# Patient Record
Sex: FEMALE | Race: WHITE
[De-identification: names, ages, dates, MRNs, and addresses within clinical notes are randomized per-mention and may not be internally consistent; named-entity substitution may affect disease eponyms.]

---

## 2017-05-03 ENCOUNTER — HOSPITAL ENCOUNTER (OUTPATIENT)
Dept: HOSPITAL 45 - C.LABBC | Age: 82
Discharge: HOME | End: 2017-05-03
Attending: INTERNAL MEDICINE
Payer: MEDICARE

## 2017-05-03 DIAGNOSIS — M81.0: ICD-10-CM

## 2017-05-03 DIAGNOSIS — M19.90: ICD-10-CM

## 2017-05-03 DIAGNOSIS — I10: ICD-10-CM

## 2017-05-03 DIAGNOSIS — I71.2: Primary | ICD-10-CM

## 2017-05-03 DIAGNOSIS — E78.5: ICD-10-CM

## 2017-05-03 DIAGNOSIS — G43.109: ICD-10-CM

## 2017-05-03 LAB
ALBUMIN/GLOB SERPL: 1.1 {RATIO} (ref 0.9–2)
ALP SERPL-CCNC: 58 U/L (ref 45–117)
ALT SERPL-CCNC: 29 U/L (ref 12–78)
ANION GAP SERPL CALC-SCNC: 4 MMOL/L (ref 3–11)
AST SERPL-CCNC: 22 U/L (ref 15–37)
BASOPHILS # BLD: 0.03 K/UL (ref 0–0.2)
BASOPHILS NFR BLD: 0.5 %
BUN SERPL-MCNC: 27 MG/DL (ref 7–18)
BUN/CREAT SERPL: 32.1 (ref 10–20)
CALCIUM SERPL-MCNC: 8.8 MG/DL (ref 8.5–10.1)
CHLORIDE SERPL-SCNC: 105 MMOL/L (ref 98–107)
CHOLEST/HDLC SERPL: 1.9 {RATIO}
CO2 SERPL-SCNC: 33 MMOL/L (ref 21–32)
COMPLETE: YES
CREAT SERPL-MCNC: 0.83 MG/DL (ref 0.6–1.2)
EOSINOPHIL NFR BLD AUTO: 229 K/UL (ref 130–400)
GLOBULIN SER-MCNC: 3.4 GM/DL (ref 2.5–4)
GLUCOSE SERPL-MCNC: 81 MG/DL (ref 70–99)
GLUCOSE UR QL: 85 MG/DL
HCT VFR BLD CALC: 43.6 % (ref 37–47)
IG%: 0.2 %
IMM GRANULOCYTES NFR BLD AUTO: 26.3 %
KETONES UR QL STRIP: 69 MG/DL
LYMPHOCYTES # BLD: 1.61 K/UL (ref 1.2–3.4)
MCH RBC QN AUTO: 31.6 PG (ref 25–34)
MCHC RBC AUTO-ENTMCNC: 32.8 G/DL (ref 32–36)
MCV RBC AUTO: 96.5 FL (ref 80–100)
MONOCYTES NFR BLD: 14.2 %
NEUTROPHILS # BLD AUTO: 2.6 %
NEUTROPHILS NFR BLD AUTO: 56.2 %
NITRITE UR QL STRIP: 47 MG/DL (ref 0–150)
PH UR: 163 MG/DL (ref 0–200)
PMV BLD AUTO: 10.8 FL (ref 7.4–10.4)
POTASSIUM SERPL-SCNC: 4 MMOL/L (ref 3.5–5.1)
RBC # BLD AUTO: 4.52 M/UL (ref 4.2–5.4)
SODIUM SERPL-SCNC: 142 MMOL/L (ref 136–145)
TSH SERPL-ACNC: 2.37 UIU/ML (ref 0.3–4.5)
VERY LOW DENSITY LIPOPROT CALC: 9 MG/DL
WBC # BLD AUTO: 6.12 K/UL (ref 4.8–10.8)

## 2017-06-07 ENCOUNTER — HOSPITAL ENCOUNTER (OUTPATIENT)
Dept: HOSPITAL 45 - C.MAMM | Age: 82
Discharge: HOME | End: 2017-06-07
Attending: INTERNAL MEDICINE
Payer: MEDICARE

## 2017-06-07 DIAGNOSIS — Z09: Primary | ICD-10-CM

## 2017-06-07 DIAGNOSIS — N60.02: ICD-10-CM

## 2017-06-07 NOTE — MAMMOGRAPHY REPORT
UNILATERAL LEFT DIGITAL DIAGNOSTIC MAMMOGRAM TOMOSYNTHESIS WITH CAD AND TARGETED LEFT ULTRASOUND: 6/7
/2017

CLINICAL HISTORY: 6 Month Follow-up Left.  





TECHNIQUE:  Breast tomosynthesis in addition to standard 2D mammography was performed. Current study 
was also evaluated with a Computer Aided Detection (CAD) system.  Left CC and MLO 2-D and tomosynthes
is images were obtained.



COMPARISON: Comparison is made to exams dated:  12/7/2016 mammogram, 12/7/2016 ultrasound, 11/28/2016
 mammogram, 11/23/2015 mammogram, 11/20/2014 mammogram, and 11/12/2013 mammogram - [facility].   



BREAST COMPOSITION:  The tissue of the left breast is heterogeneously dense, which may obscure small 
masses.  



FINDINGS:  Again noted is an oval circumscribed benign-appearing 5 mm mass seen within the left lower
 inner quadrant, best seen on the tomosynthesis images.  The mass does not appear significantly fish
ed compared to the prior exam.  The remainder of the left breast is stable compared to prior exams, w
ithout suspicious masses, calcifications, or areas of architectural distortion noted.



Targeted ultrasound was performed of the area of the mammographic mass.  In the left breast at 9:00, 
1 cm from the nipple, there is an oval anechoic circumscribed mass which measures 3 x 4 x 3 mm.  This
 is stable in size and appearance compared to the 12/7/2016 ultrasound exam and is benign and compati
ble with a cyst.





IMPRESSION:  ACR BI-RADS CATEGORY 2: BENIGN, TARGETED ULTRASOUND ACR BI-RADS CATEGORY 2: BENIGN 

Benign 4 mm cyst in the left breast at 9:00, stable compared to the 12/7/2016 exam.  There is no mamm
ographic or targeted sonographic evidence of malignancy. Return to annual mammogram screening schedul
e is recommended, due November 2017.  The patient has been verbally notified of the results.  





Approximately 10% of breast cancers are not detected with mammography. A negative mammographic report
 should not delay biopsy if a clinically suggestive mass is present.



Fransisca Mustafa M.D.          

/:6/7/2017 10:24:37  



Imaging Technologist: Sowmya RAWLS(WEST)(M), [facility]

letter sent: Normal 1/2  

BI-RADS Code: ACR BI-RADS Category 2: Benign  Ultrasound BI-RADS: ACR BI-RADS Category 2: Benign

## 2017-08-14 ENCOUNTER — HOSPITAL ENCOUNTER (OUTPATIENT)
Dept: HOSPITAL 45 - C.CTS | Age: 82
Discharge: HOME | End: 2017-08-14
Attending: INTERNAL MEDICINE
Payer: MEDICARE

## 2017-08-14 DIAGNOSIS — I71.2: Primary | ICD-10-CM

## 2017-08-14 NOTE — DIAGNOSTIC IMAGING REPORT
(CHEST) THORAX WITHOUT



CLINICAL HISTORY: 87 years-old Female presenting with aneurysm of the thoracic

aorta, history of repair, no new complaints. 



TECHNIQUE: Multidetector CT imaging of the chest was performed without the use

of intravenous contrast. IV contrast: None. A dose lowering technique was used

consistent with the principles of ALARA (as low as reasonably achievable).



COMPARISON: 5/16/2016.



CT DOSE (mGy.cm): The estimated cumulative dose is 210.17 mGycm.



FINDINGS:



 topogram: Median sternotomy wires and right axillary surgical clips noted.



On soft tissue windows, right axillary surgical clips unchanged. Normal thyroid.

No axillary, supraclavicular, hilar, or mediastinal lymphadenopathy. Surgical

material noted along the aortic root from grafting repair. Ectasia of the

proximal aortic arch, which measures up to 4.6 cm in transverse dimension,

previously 4.5 cm, essentially unchanged. The descending thoracic aorta is

normal in caliber. Mild coronary artery and aortic valve calcification.

Multichamber enlargement of the heart. No pericardial or pleural effusion. Right

fat-containing Bochdalek hernia.



On lung windows, bandlike opacity in the anterior right upper lobe, likely

atelectasis or scarring. Solid triangular 4 mm subpleural nodule in the right

lower lobe (series 4 image 187). An additional 5 mm solid pulmonary nodules

noted more inferiorly (series 4 image 200). Dependent bandlike opacities at the

left lower lobe with left lower lobe volume loss, likely subsegmental

atelectasis or scarring. Airways patent.



On bone windows, degenerative changes of the thoracic spine.



IMPRESSION:

1.  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 No

change in ectasia of the proximal aortic arch. No acute intrathoracic pathology.



2.  Cardiomegaly.



3.  Two solid pulmonary nodules unchanged from prior exam are stable for one

year. No further follow-up is warranted per Fleischer Society 2017

recommendations.



Please refer to below summary of Fleischner Society 2017 recommendations for

follow-up of incidental CT nodules (FORREST Austin et al. Guidelines for management

of incidental pulmonary nodules detected on CT images: From the Fleischner

Society 2017. Radiology 2017; 284: 228-243.)



SOLID NODULES



Single nodule; size <6 mm

*  Low risk patients: No routine follow-up

*  High risk patients: Optional CT at 12 months



Single nodule; size 6-8 mm

*  Low risk patients: CT at 6-12 months, then consider CT at 18-24 months

*  High risk patients: CT at 6-12 months, then at 18-24 months



Single nodule; size >8 mm

*  Either low or high risk patients: Considered CT at 3 months, PET/CT, or

tissue sampling



Multiple nodules; size <6 mm

*  Low risk patients: No routine follow up

*  High risk patients: Optional CT at 12 months



Multiple nodules; size 6-8 mm

*  Low risk patients: CT at 3-6 months, then consider CT at 18-24 months

*  High risk patients: CT at 3-6 months, then at 18-24 months



Multiple nodules; size >8 mm

*  Low risk patients: CT at 3-6 months, then consider at 18-24 months

*  High risk patients: CT at 3-6 months, then at 18-24 months



Note: These guidelines apply to incidental nodules. These guidelines did not

apply to patients younger than 35 years, immunocompromised patients, or patients

with cancer.

*  Low risk patients: Minimal or absent history of smoking and/or other known

risk factors

*  High risk patients: History of smoking, exposure to other carcinogens,

emphysema, fibrosis, upper lobe location, family history of lung cancer, etc. 

*  If a nodule up to 8 mm is partly solid or is ground glass further follow-up

is required after 24 months to exclude possible slow growing adenocarcinoma



SUBSOLID NODULES



Single ground-glass nodule

*  Nodule size < 6 mm: No routine follow-up

*  Nodule size > or = 6 mm: CT at 6-12 months to confirm persistence, then CT

every 2 years until 5 years



Single part-solid nodule

*  Nodule size < 6 mm: No routine follow-up

*  Nodules size > or = 6 mm: CT at 3-6 months to confirm persistence. If

unchanged and solid component remains < 6 mm, annual CT should be performed for

5 years



Multiple nodules

*  Nodule size < 6 mm: CT at 3-6 months. If stable, consider CT at 2 and 4

years.

*  Nodules size > or = 6 mm: CT at 3-6 months. Subsequent management based on

the most suspicious nodule(s)







       







Electronically signed by:  Solis Alarcon M.D.

8/14/2017 3:26 PM



Dictated Date/Time:  8/14/2017 3:16 PM

## 2017-11-29 ENCOUNTER — HOSPITAL ENCOUNTER (OUTPATIENT)
Dept: HOSPITAL 45 - C.MAMM | Age: 82
Discharge: HOME | End: 2017-11-29
Attending: INTERNAL MEDICINE
Payer: MEDICARE

## 2017-11-29 DIAGNOSIS — Z12.31: Primary | ICD-10-CM

## 2018-02-15 ENCOUNTER — HOSPITAL ENCOUNTER (OUTPATIENT)
Dept: HOSPITAL 45 - C.RADBC | Age: 83
Discharge: HOME | End: 2018-02-15
Attending: INTERNAL MEDICINE
Payer: MEDICARE

## 2018-02-15 DIAGNOSIS — M16.11: ICD-10-CM

## 2018-02-15 DIAGNOSIS — M25.551: Primary | ICD-10-CM

## 2018-02-15 NOTE — DIAGNOSTIC IMAGING REPORT
R HIP UNILATERAL 2 VIEWS



HISTORY:  87 years-old Female M25.559 Hip [id number] chronic right hip

pain without known trauma



COMPARISON: None available



TECHNIQUE: 2 views of the right hip



FINDINGS: 

Moderate to severe right hip osteoarthritis with moderate bone demineralization.

No acute fracture or subluxation is identified. Imaged right hemipelvis appears

intact. Soft tissues are unremarkable.



IMPRESSION: Moderate to severe right osteoarthritis without acute fracture or

dislocation. 







The above report was generated using voice recognition software. It may contain

grammatical, syntax or spelling errors.







Electronically signed by:  Meir Hardy M.D.

2/15/2018 11:50 AM



Dictated Date/Time:  2/15/2018 11:49 AM

## 2018-03-01 ENCOUNTER — HOSPITAL ENCOUNTER (OUTPATIENT)
Dept: HOSPITAL 45 - C.RADBC | Age: 83
Discharge: HOME | End: 2018-03-01
Attending: INTERNAL MEDICINE
Payer: MEDICARE

## 2018-03-01 DIAGNOSIS — R06.09: Primary | ICD-10-CM

## 2018-03-01 DIAGNOSIS — I10: ICD-10-CM

## 2018-03-01 LAB
ALBUMIN SERPL-MCNC: 3.5 GM/DL (ref 3.4–5)
ALP SERPL-CCNC: 56 U/L (ref 45–117)
ALT SERPL-CCNC: 66 U/L (ref 12–78)
AST SERPL-CCNC: 37 U/L (ref 15–37)
BASOPHILS # BLD: 0.04 K/UL (ref 0–0.2)
BASOPHILS NFR BLD: 0.5 %
BUN SERPL-MCNC: 34 MG/DL (ref 7–18)
CALCIUM SERPL-MCNC: 8.8 MG/DL (ref 8.5–10.1)
CO2 SERPL-SCNC: 32 MMOL/L (ref 21–32)
CREAT SERPL-MCNC: 0.96 MG/DL (ref 0.6–1.2)
EOS ABS #: 0.09 K/UL (ref 0–0.5)
EOSINOPHIL NFR BLD AUTO: 218 K/UL (ref 130–400)
GLUCOSE SERPL-MCNC: 98 MG/DL (ref 70–99)
HCT VFR BLD CALC: 40.3 % (ref 37–47)
HGB BLD-MCNC: 13.3 G/DL (ref 12–16)
IG#: 0.02 K/UL (ref 0–0.02)
IMM GRANULOCYTES NFR BLD AUTO: 24.4 %
LYMPHOCYTES # BLD: 1.87 K/UL (ref 1.2–3.4)
MCH RBC QN AUTO: 31.5 PG (ref 25–34)
MCHC RBC AUTO-ENTMCNC: 33 G/DL (ref 32–36)
MCV RBC AUTO: 95.5 FL (ref 80–100)
MONO ABS #: 0.81 K/UL (ref 0.11–0.59)
MONOCYTES NFR BLD: 10.6 %
NEUT ABS #: 4.84 K/UL (ref 1.4–6.5)
NEUTROPHILS # BLD AUTO: 1.2 %
NEUTROPHILS NFR BLD AUTO: 63 %
PMV BLD AUTO: 10.5 FL (ref 7.4–10.4)
POTASSIUM SERPL-SCNC: 3.8 MMOL/L (ref 3.5–5.1)
PROT SERPL-MCNC: 6.6 GM/DL (ref 6.4–8.2)
RED CELL DISTRIBUTION WIDTH CV: 14.4 % (ref 11.5–14.5)
RED CELL DISTRIBUTION WIDTH SD: 50.3 FL (ref 36.4–46.3)
SODIUM SERPL-SCNC: 138 MMOL/L (ref 136–145)
WBC # BLD AUTO: 7.67 K/UL (ref 4.8–10.8)

## 2018-03-01 NOTE — DIAGNOSTIC IMAGING REPORT
CHEST 2 VIEWS ROUTINE



CLINICAL HISTORY: I10 [id number] Dyspnea on exertion    



COMPARISON STUDY:  Chest CT dated 8/14/2017



FINDINGS: There are postsurgical changes of midline sternotomy. The heart is

enlarged. There is aortic tortuosity/ectasia. Surgical clips project over the

right upper lung zone. There is no failure. There is no focal pulmonary

consolidation. There are no pleural effusions.[ There is a thoracolumbar

scoliosis.



IMPRESSION: Cardiomegaly. No evidence of focal pulmonary consolidation







Electronically signed by:  Miguel Sanders M.D.

3/1/2018 3:58 PM



Dictated Date/Time:  3/1/2018 3:57 PM

## 2018-04-16 ENCOUNTER — HOSPITAL ENCOUNTER (OUTPATIENT)
Dept: HOSPITAL 45 - C.LABBC | Age: 83
Discharge: HOME | End: 2018-04-16
Attending: INTERNAL MEDICINE
Payer: MEDICARE

## 2018-04-16 DIAGNOSIS — Z01.818: Primary | ICD-10-CM

## 2018-04-16 LAB
BUN SERPL-MCNC: 28 MG/DL (ref 7–18)
CALCIUM SERPL-MCNC: 9.8 MG/DL (ref 8.5–10.1)
CO2 SERPL-SCNC: 29 MMOL/L (ref 21–32)
CREAT SERPL-MCNC: 1.05 MG/DL (ref 0.6–1.2)
EOSINOPHIL NFR BLD AUTO: 268 K/UL (ref 130–400)
GLUCOSE SERPL-MCNC: 98 MG/DL (ref 70–99)
HCT VFR BLD CALC: 44.4 % (ref 37–47)
HGB BLD-MCNC: 14.9 G/DL (ref 12–16)
INR PPP: 1 (ref 0.9–1.1)
MCH RBC QN AUTO: 31.8 PG (ref 25–34)
MCHC RBC AUTO-ENTMCNC: 33.6 G/DL (ref 32–36)
MCV RBC AUTO: 94.9 FL (ref 80–100)
PMV BLD AUTO: 10.6 FL (ref 7.4–10.4)
POTASSIUM SERPL-SCNC: 4 MMOL/L (ref 3.5–5.1)
PTT PATIENT: 27.1 SECONDS (ref 21–31)
RED CELL DISTRIBUTION WIDTH CV: 14.5 % (ref 11.5–14.5)
RED CELL DISTRIBUTION WIDTH SD: 50.2 FL (ref 36.4–46.3)
SODIUM SERPL-SCNC: 137 MMOL/L (ref 136–145)
WBC # BLD AUTO: 9.01 K/UL (ref 4.8–10.8)

## 2018-08-09 ENCOUNTER — HOSPITAL ENCOUNTER (OUTPATIENT)
Dept: HOSPITAL 45 - C.LABBC | Age: 83
Discharge: HOME | End: 2018-08-09
Attending: INTERNAL MEDICINE
Payer: MEDICARE

## 2018-08-09 DIAGNOSIS — I71.2: Primary | ICD-10-CM

## 2018-08-09 DIAGNOSIS — E78.5: ICD-10-CM

## 2018-08-09 DIAGNOSIS — M81.0: ICD-10-CM

## 2018-08-09 DIAGNOSIS — I10: ICD-10-CM

## 2018-08-09 DIAGNOSIS — M19.90: ICD-10-CM

## 2018-08-09 LAB
ALBUMIN SERPL-MCNC: 3.5 GM/DL (ref 3.4–5)
ALP SERPL-CCNC: 56 U/L (ref 45–117)
ALT SERPL-CCNC: 37 U/L (ref 12–78)
AST SERPL-CCNC: 29 U/L (ref 15–37)
BASOPHILS # BLD: 0.06 K/UL (ref 0–0.2)
BASOPHILS NFR BLD: 0.7 %
BUN SERPL-MCNC: 24 MG/DL (ref 7–18)
CALCIUM SERPL-MCNC: 8.6 MG/DL (ref 8.5–10.1)
CO2 SERPL-SCNC: 28 MMOL/L (ref 21–32)
CREAT SERPL-MCNC: 1.04 MG/DL (ref 0.6–1.2)
EOS ABS #: 0.11 K/UL (ref 0–0.5)
EOSINOPHIL NFR BLD AUTO: 255 K/UL (ref 130–400)
GLUCOSE SERPL-MCNC: 109 MG/DL (ref 70–99)
HCT VFR BLD CALC: 41.4 % (ref 37–47)
HGB BLD-MCNC: 13.7 G/DL (ref 12–16)
IG#: 0.02 K/UL (ref 0–0.02)
IMM GRANULOCYTES NFR BLD AUTO: 22.8 %
KETONES UR QL STRIP: 33 MG/DL
LYMPHOCYTES # BLD: 1.83 K/UL (ref 1.2–3.4)
MCH RBC QN AUTO: 30.9 PG (ref 25–34)
MCHC RBC AUTO-ENTMCNC: 33.1 G/DL (ref 32–36)
MCV RBC AUTO: 93.5 FL (ref 80–100)
MONO ABS #: 0.81 K/UL (ref 0.11–0.59)
MONOCYTES NFR BLD: 10.1 %
NEUT ABS #: 5.18 K/UL (ref 1.4–6.5)
NEUTROPHILS # BLD AUTO: 1.4 %
NEUTROPHILS NFR BLD AUTO: 64.8 %
PH UR: 122 MG/DL (ref 0–200)
PMV BLD AUTO: 10.2 FL (ref 7.4–10.4)
POTASSIUM SERPL-SCNC: 3.8 MMOL/L (ref 3.5–5.1)
PROT SERPL-MCNC: 6.8 GM/DL (ref 6.4–8.2)
RED CELL DISTRIBUTION WIDTH CV: 14.7 % (ref 11.5–14.5)
RED CELL DISTRIBUTION WIDTH SD: 50.8 FL (ref 36.4–46.3)
SODIUM SERPL-SCNC: 136 MMOL/L (ref 136–145)
WBC # BLD AUTO: 8.01 K/UL (ref 4.8–10.8)

## 2018-08-15 ENCOUNTER — HOSPITAL ENCOUNTER (OUTPATIENT)
Dept: HOSPITAL 45 - C.CTS | Age: 83
Discharge: HOME | End: 2018-08-15
Attending: INTERNAL MEDICINE
Payer: MEDICARE

## 2018-08-15 DIAGNOSIS — Z98.890: ICD-10-CM

## 2018-08-15 DIAGNOSIS — I71.2: Primary | ICD-10-CM

## 2018-08-15 DIAGNOSIS — I51.7: ICD-10-CM

## 2018-08-15 NOTE — DIAGNOSTIC IMAGING REPORT
(CHEST) THORAX WITHOUT



CLINICAL HISTORY: 88 years-old Female presenting with I71.2 Aneurysm of thoracic

[id number]. 



TECHNIQUE: Multidetector CT imaging of the chest was performed without the use

of intravenous contrast. IV contrast: None. A dose lowering technique was used

consistent with the principles of ALARA (as low as reasonably achievable).



COMPARISON: 8/14/2017.



CT DOSE (mGy.cm): The estimated cumulative dose is 346.97 mGycm.



FINDINGS:



 topogram: Left subclavian pacer with leads to the right atrium and right

ventricular apex. Median sternotomy wires. Cardiomegaly. S-shaped scoliotic

curvature of the thoracolumbar spine. Surgical clips in the right axilla.



On soft tissue windows, normal thyroid and thoracic inlet. No axillary,

supraclavicular, or mediastinal lymphadenopathy. Evaluation of the león limited

without intravenous contrast. Atherosclerosis of the aorta. Postsurgical changes

of ascending aortic aneurysm repair. Immediately distal to the repair, the

proximal aortic arch measures 4.6 cm in diameter. The ascending aorta is not

ectatic nor is the descending thoracic aorta. Limited coronary artery

calcification. Multichamber enlargement of the heart most prominently of the

right atrium. Small left pleural effusion. No pericardial effusion. Left renal

pelviectasis, which was likely present on the prior exam.



On lung windows, bandlike opacities in the paramediastinal left lower lobe

unchanged from prior and consistent with cicatrizing atelectasis. Additional

paramediastinal linear opacities in the right middle and right upper lobes

likely also cicatrizing atelectasis. Previously noted solid triangular 4 mm

subpleural nodule in the right lower lobe (series 4 image 180), unchanged. Solid

5 mm right lower lobe nodule (series 4 image 200), also unchanged. Multiple

additional smaller solid nodules noted in the right middle lobe, unchanged.

Solid peripheral 3 mm left lower lobe nodule (series 4 image 154), unchanged.

Scattered punctate nodules also noted. No new pulmonary nodule. No pneumothorax.



On bone windows, degenerative changes of the spine.



IMPRESSION:

1.  Stable postsurgical changes of the ascending thoracic aortic aneurysm

repair. 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



2.  Cardiomegaly.



3.  New small left pleural effusion.



4.  Stable bilateral solid pulmonary nodules.







Electronically signed by:  Solis Alarcon M.D.

8/15/2018 10:08 AM



Dictated Date/Time:  8/15/2018 9:56 AM

## 2018-08-27 ENCOUNTER — HOSPITAL ENCOUNTER (OUTPATIENT)
Dept: HOSPITAL 45 - C.LABBC | Age: 83
Discharge: HOME | End: 2018-08-27
Attending: INTERNAL MEDICINE
Payer: MEDICARE

## 2018-08-27 DIAGNOSIS — R39.9: ICD-10-CM

## 2018-08-27 DIAGNOSIS — R31.0: Primary | ICD-10-CM
